# Patient Record
Sex: MALE | Race: WHITE
[De-identification: names, ages, dates, MRNs, and addresses within clinical notes are randomized per-mention and may not be internally consistent; named-entity substitution may affect disease eponyms.]

---

## 2018-01-09 ENCOUNTER — HOSPITAL ENCOUNTER (OUTPATIENT)
Dept: HOSPITAL 89 - RAD | Age: 78
End: 2018-01-09
Attending: OPHTHALMOLOGY
Payer: MEDICARE

## 2018-01-09 DIAGNOSIS — I65.23: Primary | ICD-10-CM

## 2018-01-09 PROCEDURE — 93880 EXTRACRANIAL BILAT STUDY: CPT

## 2018-01-09 PROCEDURE — 71046 X-RAY EXAM CHEST 2 VIEWS: CPT

## 2018-01-09 NOTE — RADIOLOGY IMAGING REPORT
[place] [facility] [place] 

 

PATIENT NAME: Bart Crain

: 1940

MR: [id number]

V: [id number]

EXAM DATE: 

ORDERING PHYSICIAN: VICENTA MCINTOSH

TECHNOLOGIST: 

 

Location: [facility] [place]

Patient: Bart Crain

: 1940

MRN: [id number]

Visit/Account:[id number]

Date of Sevice:  2018

 

ACCESSION #: [id number]

 

Carotid artery Doppler duplex ultrasound scan.

 

HISTORY: Amaurosis fugax.

 

COMPARISON: 2016.

 

A color flow Doppler duplex ultrasound scan with spectral analysis was performed on the carotid and v
ertebral arteries bilaterally.  Measurement of carotid stenosis is based on velocity parameters that 
correlate the residual internal carotid diameter with [employer] Symptomatic Carotid Endarterecto
my Trial (NASCET)- based stenosis levels.

 

Right carotid peak systolic velocities are as follows:

Superior right ICA -    104 cm/sec.

Mid right ICA -             86 cm/sec.

Proximal right ICA -    56 cm/sec.

Right carotid bulb -     87 cm/sec.

Superior right CCA -  89 cm/sec.

Mid right CCA-           105 cm/sec.

Inferior right CCA-      180 cm/sec.

Proximal right ECA-   120 cm/sec.

Mid right vertebral-     41 cm/sec.

Right ICA/CCA ratio- 0.8 ( normal < 1.5 ).

Antegrade right vertebral artery flow- YES.

 

Left carotid peak systolic velocities are as follows:

Superior left ICA -    112 cm/sec.

Mid left ICA-              88 cm/sec.

Proximal left ICA-     100 cm/sec.

Left carotid bulb-      92 cm/sec.

Superior left CCA-   92 cm/sec.

Mid left CCA-           124 cm/sec.

Inferior left CCA-      192 cm/sec.

Proximal left ECA-   105 cm/sec.

Mid left vertebral-     66 cm/sec.

Left ICA/CCA ratio- 0.7 ( normal < 1.5).

Antegrade left vertebral artery flow- YES.

 

Mild plaque is present in the carotid bulbs and proximal internal carotid arteries bilaterally. Veloc
ities are mildly elevated in the common carotid arteries bilaterally however no significant focal leland
noses are identified by visual criteria. Carotid velocities are similar compared to previous.

 

IMPRESSION:

 

Mild bilateral carotid atherosclerosis without evidence of hemodynamically significant focal stenosis
.

 

 

Report Dictated By: Kurtis Macedo MD at 2018 5:12 PM

 

Report E-Signed By: Kurtis Macedo MD  at 2018 5:16 PM

 

WSN:[id number]

## 2018-01-09 NOTE — RADIOLOGY IMAGING REPORT
FACILITY: [facility] 

 

PATIENT NAME: Bart Crain

: 1940

MR: [id number]

V: [id number]

EXAM DATE: 

ORDERING PHYSICIAN: VICENTA MCINTOSH

TECHNOLOGIST: 

 

Location: [facility]

Patient: Bart Crain

: 1940

MRN: [id number]

Visit/Account:[id number]

Date of Sevice:  2018

 

ACCESSION #: [id number]

 

Chest 2 views:

 

HISTORY: Sarcoidosis, no chest complaints.  Previous smoker 30 years ago.

 

COMPARISON: 2017

 

FINDINGS: Frontal and lateral chest: Cardiomediastinal silhouette is within normal limits.  There is 
mild prominence interstitial markings, similar to previous.  There is no focal infiltrate, pleural ef
fusion or pneumothorax.  Pulmonary vasculature is normal.  Urinary of airspace opacity seen on the pr
ior study has decreased in conspicuity.

 

Degenerative changes are present in the thoracic spine.

 

IMPRESSION: No evidence of acute cardiopulmonary abnormality.  Mild prominence of the interstitial ma
rkings may be secondary to the patient's smoking history and history of sarcoidosis.

 

Report Dictated By: Carol Amaya MD at 2018 4:38 PM

 

Report E-Signed By: Carol Amaya MD  at 2018 4:41 PM

 

WSN:[id number]

## 2019-08-22 ENCOUNTER — HOSPITAL ENCOUNTER (OUTPATIENT)
Dept: HOSPITAL 89 - RESP | Age: 79
End: 2019-08-22
Attending: FAMILY MEDICINE
Payer: MEDICARE

## 2019-08-22 DIAGNOSIS — Z01.818: Primary | ICD-10-CM

## 2019-08-22 DIAGNOSIS — I10: ICD-10-CM

## 2019-08-22 PROCEDURE — 93005 ELECTROCARDIOGRAM TRACING: CPT

## 2019-08-22 NOTE — EKG
FACILITY: [facility] - [place] 

 

PATIENT NAME: CHANDAN VICTOR

: [id number]

MR: [id number]

V: [id number]

EXAM DATE: 

ORDERING PHYSICIAN: TAVO MENDES

TECHNOLOGIST: 

 

Test Reason : EKG

Blood Pressure : ***/*** mmHG

Vent. Rate : 063 BPM     Atrial Rate : 063 BPM

   P-R Int : 184 ms          QRS Dur : 090 ms

    QT Int : 386 ms       P-R-T Axes : [phone] degrees

   QTc Int : 395 ms

 

Normal sinus rhythm

Normal ECG

When compared with ECG of 2017 11:36,

No significant change was found

 

Referred By:             Confirmed By: